# Patient Record
Sex: FEMALE | NOT HISPANIC OR LATINO | URBAN - METROPOLITAN AREA
[De-identification: names, ages, dates, MRNs, and addresses within clinical notes are randomized per-mention and may not be internally consistent; named-entity substitution may affect disease eponyms.]

---
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
## 2022-02-16 NOTE — ED PROVIDER NOTE - CROS ED ENMT ALL NEG
DOCUMENTATION OF PAR STATUS:    1. Name of Medication & Dose: Aimovig 70mg     2. Name of Prescription Coverage Company & phone #: [employer]     3. Date Prior Auth Submitted: 02/16/2022    4. What information was given to obtain insurance decision? Clinical chartnotes    5. Prior Auth Status? Pending    6. Patient Notified: no    [facility] not covered it is under [employer] now. Awaiting response of PA        negative...

## 2023-03-10 NOTE — ED PEDIATRIC TRIAGE NOTE - CHIEF COMPLAINT QUOTE
Laceration to left forearm from fence. [de-identified] : will obtain updated MRI [facility]\par last was 2020.\par \par